# Patient Record
Sex: MALE | Race: WHITE | NOT HISPANIC OR LATINO | Employment: FULL TIME | URBAN - NONMETROPOLITAN AREA
[De-identification: names, ages, dates, MRNs, and addresses within clinical notes are randomized per-mention and may not be internally consistent; named-entity substitution may affect disease eponyms.]

---

## 2022-01-09 PROCEDURE — U0004 COV-19 TEST NON-CDC HGH THRU: HCPCS | Performed by: NURSE PRACTITIONER

## 2024-04-17 ENCOUNTER — APPOINTMENT (OUTPATIENT)
Dept: CT IMAGING | Facility: HOSPITAL | Age: 34
End: 2024-04-17
Payer: COMMERCIAL

## 2024-04-17 ENCOUNTER — APPOINTMENT (OUTPATIENT)
Dept: GENERAL RADIOLOGY | Facility: HOSPITAL | Age: 34
End: 2024-04-17
Payer: COMMERCIAL

## 2024-04-17 ENCOUNTER — HOSPITAL ENCOUNTER (EMERGENCY)
Facility: HOSPITAL | Age: 34
Discharge: HOME | End: 2024-04-17
Payer: COMMERCIAL

## 2024-04-17 VITALS
DIASTOLIC BLOOD PRESSURE: 85 MMHG | HEIGHT: 71 IN | HEART RATE: 76 BPM | BODY MASS INDEX: 32.9 KG/M2 | SYSTOLIC BLOOD PRESSURE: 126 MMHG | OXYGEN SATURATION: 97 % | TEMPERATURE: 97.9 F | RESPIRATION RATE: 20 BRPM | WEIGHT: 235 LBS

## 2024-04-17 DIAGNOSIS — F41.0 PANIC ATTACK: ICD-10-CM

## 2024-04-17 DIAGNOSIS — R06.00 DYSPNEA, UNSPECIFIED TYPE: Primary | ICD-10-CM

## 2024-04-17 LAB
ALBUMIN SERPL-MCNC: 4.7 G/DL (ref 3.5–5.2)
ALBUMIN/GLOB SERPL: 1.8 G/DL
ALP SERPL-CCNC: 49 U/L (ref 39–117)
ALT SERPL W P-5'-P-CCNC: 26 U/L (ref 1–41)
AMPHET+METHAMPHET UR QL: NEGATIVE
AMPHETAMINES UR QL: NEGATIVE
ANION GAP SERPL CALCULATED.3IONS-SCNC: 8 MMOL/L (ref 5–15)
AST SERPL-CCNC: 28 U/L (ref 1–40)
BARBITURATES UR QL SCN: NEGATIVE
BASOPHILS # BLD AUTO: 0.05 10*3/MM3 (ref 0–0.2)
BASOPHILS NFR BLD AUTO: 0.5 % (ref 0–1.5)
BENZODIAZ UR QL SCN: NEGATIVE
BILIRUB SERPL-MCNC: 0.4 MG/DL (ref 0–1.2)
BUN SERPL-MCNC: 18 MG/DL (ref 6–20)
BUN/CREAT SERPL: 15.3 (ref 7–25)
BUPRENORPHINE SERPL-MCNC: NEGATIVE NG/ML
CALCIUM SPEC-SCNC: 9.6 MG/DL (ref 8.6–10.5)
CANNABINOIDS SERPL QL: NEGATIVE
CHLORIDE SERPL-SCNC: 103 MMOL/L (ref 98–107)
CO2 SERPL-SCNC: 27 MMOL/L (ref 22–29)
COCAINE UR QL: NEGATIVE
CREAT SERPL-MCNC: 1.18 MG/DL (ref 0.76–1.27)
DEPRECATED RDW RBC AUTO: 38.8 FL (ref 37–54)
EGFRCR SERPLBLD CKD-EPI 2021: 83 ML/MIN/1.73
EOSINOPHIL # BLD AUTO: 0.08 10*3/MM3 (ref 0–0.4)
EOSINOPHIL NFR BLD AUTO: 0.8 % (ref 0.3–6.2)
ERYTHROCYTE [DISTWIDTH] IN BLOOD BY AUTOMATED COUNT: 12.8 % (ref 12.3–15.4)
FENTANYL UR-MCNC: NEGATIVE NG/ML
FLUAV RNA RESP QL NAA+PROBE: NOT DETECTED
FLUBV RNA RESP QL NAA+PROBE: NOT DETECTED
GLOBULIN UR ELPH-MCNC: 2.6 GM/DL
GLUCOSE SERPL-MCNC: 103 MG/DL (ref 65–99)
HCT VFR BLD AUTO: 42.3 % (ref 37.5–51)
HGB BLD-MCNC: 14.6 G/DL (ref 13–17.7)
IMM GRANULOCYTES # BLD AUTO: 0.02 10*3/MM3 (ref 0–0.05)
IMM GRANULOCYTES NFR BLD AUTO: 0.2 % (ref 0–0.5)
LYMPHOCYTES # BLD AUTO: 1.56 10*3/MM3 (ref 0.7–3.1)
LYMPHOCYTES NFR BLD AUTO: 16.3 % (ref 19.6–45.3)
MCH RBC QN AUTO: 28.7 PG (ref 26.6–33)
MCHC RBC AUTO-ENTMCNC: 34.5 G/DL (ref 31.5–35.7)
MCV RBC AUTO: 83.1 FL (ref 79–97)
METHADONE UR QL SCN: NEGATIVE
MONOCYTES # BLD AUTO: 0.57 10*3/MM3 (ref 0.1–0.9)
MONOCYTES NFR BLD AUTO: 5.9 % (ref 5–12)
NEUTROPHILS NFR BLD AUTO: 7.3 10*3/MM3 (ref 1.7–7)
NEUTROPHILS NFR BLD AUTO: 76.3 % (ref 42.7–76)
NRBC BLD AUTO-RTO: 0 /100 WBC (ref 0–0.2)
OPIATES UR QL: NEGATIVE
OXYCODONE UR QL SCN: NEGATIVE
PCP UR QL SCN: NEGATIVE
PLATELET # BLD AUTO: 300 10*3/MM3 (ref 140–450)
PMV BLD AUTO: 9.5 FL (ref 6–12)
POTASSIUM SERPL-SCNC: 3.6 MMOL/L (ref 3.5–5.2)
PROT SERPL-MCNC: 7.3 G/DL (ref 6–8.5)
RBC # BLD AUTO: 5.09 10*6/MM3 (ref 4.14–5.8)
RSV RNA RESP QL NAA+PROBE: NOT DETECTED
SARS-COV-2 RNA RESP QL NAA+PROBE: NOT DETECTED
SODIUM SERPL-SCNC: 138 MMOL/L (ref 136–145)
TRICYCLICS UR QL SCN: NEGATIVE
TROPONIN T SERPL HS-MCNC: <6 NG/L
TSH SERPL DL<=0.05 MIU/L-ACNC: 1.88 UIU/ML (ref 0.27–4.2)
WBC NRBC COR # BLD AUTO: 9.58 10*3/MM3 (ref 3.4–10.8)

## 2024-04-17 PROCEDURE — 25510000001 IOPAMIDOL PER 1 ML: Performed by: NURSE PRACTITIONER

## 2024-04-17 PROCEDURE — 96374 THER/PROPH/DIAG INJ IV PUSH: CPT

## 2024-04-17 PROCEDURE — 25010000002 LORAZEPAM PER 2 MG: Performed by: NURSE PRACTITIONER

## 2024-04-17 PROCEDURE — 71045 X-RAY EXAM CHEST 1 VIEW: CPT

## 2024-04-17 PROCEDURE — 99285 EMERGENCY DEPT VISIT HI MDM: CPT

## 2024-04-17 PROCEDURE — 36415 COLL VENOUS BLD VENIPUNCTURE: CPT

## 2024-04-17 PROCEDURE — 80307 DRUG TEST PRSMV CHEM ANLYZR: CPT | Performed by: NURSE PRACTITIONER

## 2024-04-17 PROCEDURE — 80053 COMPREHEN METABOLIC PANEL: CPT | Performed by: NURSE PRACTITIONER

## 2024-04-17 PROCEDURE — 85025 COMPLETE CBC W/AUTO DIFF WBC: CPT | Performed by: NURSE PRACTITIONER

## 2024-04-17 PROCEDURE — 84484 ASSAY OF TROPONIN QUANT: CPT | Performed by: NURSE PRACTITIONER

## 2024-04-17 PROCEDURE — 87637 SARSCOV2&INF A&B&RSV AMP PRB: CPT | Performed by: NURSE PRACTITIONER

## 2024-04-17 PROCEDURE — 93010 ELECTROCARDIOGRAM REPORT: CPT | Performed by: INTERNAL MEDICINE

## 2024-04-17 PROCEDURE — 93005 ELECTROCARDIOGRAM TRACING: CPT | Performed by: EMERGENCY MEDICINE

## 2024-04-17 PROCEDURE — 71275 CT ANGIOGRAPHY CHEST: CPT

## 2024-04-17 PROCEDURE — 84443 ASSAY THYROID STIM HORMONE: CPT | Performed by: NURSE PRACTITIONER

## 2024-04-17 RX ORDER — SODIUM CHLORIDE 0.9 % (FLUSH) 0.9 %
10 SYRINGE (ML) INJECTION AS NEEDED
Status: DISCONTINUED | OUTPATIENT
Start: 2024-04-17 | End: 2024-04-17 | Stop reason: HOSPADM

## 2024-04-17 RX ORDER — LORAZEPAM 2 MG/ML
1 INJECTION INTRAMUSCULAR ONCE
Status: COMPLETED | OUTPATIENT
Start: 2024-04-17 | End: 2024-04-17

## 2024-04-17 RX ORDER — FLUOXETINE HYDROCHLORIDE 20 MG/1
20 CAPSULE ORAL DAILY
COMMUNITY

## 2024-04-17 RX ADMIN — LORAZEPAM 1 MG: 2 INJECTION, SOLUTION INTRAMUSCULAR; INTRAVENOUS at 15:23

## 2024-04-17 RX ADMIN — IOPAMIDOL 100 ML: 755 INJECTION, SOLUTION INTRAVENOUS at 15:30

## 2024-04-17 NOTE — DISCHARGE INSTRUCTIONS
Return to ER if symptoms worsen   Follow up with one of the [facility] physician groups below to setup primary care. If you have trouble making an appointment, please call the [facility] Nurse Line at [phone]    [facility] - [place]  [address] [place] [address]  [id number] [phone]    [facility] - [address], [address], [place], [place] [address] [phone]    [facility] - [address], [address], [place], [place] [id number] [phone]     [facility] ([facility]) - [place]  [address], [address], [place] [address] [phone]    [facility] - [address], [address] [phone]    [facility] - [address], [place] [address] [phone]    [facility] - [place]  [address] [place], [place], [address] [phone]    [facility] - [place]  [address], [address] [phone]    [facility] - [employer]  [address] [address] [phone]    [facility] - [place]  [address] [place] [address], [place], [address] [phone]        PEDIATRIC:    [facility] - [place] [address], [address], [place] [address] [phone]

## 2024-04-17 NOTE — ED PROVIDER NOTES
"Subjective   History of Present Illness  Patient is a 34-year-old male presents emergency department per EMS with complaints of dyspnea, dizziness, sudden onset of shortness of breath while driving down the road.  He does have a history of anxiety however he has not had a panic attack in probably about 2 years.  He states he has not been under any unusual stress.  He states he is very stressed at work.  He does take Prozac for anxiety and has been on the same dose for about the past year.  He states while driving down the road he felt sudden onset of shortness of breath dizziness, panic, tingling in his arms and legs and felt like he could not \"get a good breath in.\"  He states he pulled over on the side of the road and called his wife his wife arrived was going to bring him to the hospital and he became very short of breath and anxious and tearful and panicky and EMS was called and patient was transported here per EMS.  He denies any recent travel.  Denies any chest pain states he just had some chest tightness when he could not get in a good breath.  He denies any cough or congestion.  He states he did feel like his heart was racing when this happened as well.  No fever or chills.  He had some nausea but no vomiting.  He states he still feels very anxious at this time and feels short of breath as well.  He is unsure of his family medical history.    History provided by:  Patient   used: No        Review of Systems   Constitutional: Negative.    HENT: Negative.     Eyes: Negative.    Respiratory:          Patient is a 34-year-old male presents emergency department per EMS with complaints of dyspnea, dizziness, sudden onset of shortness of breath while driving down the road.  He does have a history of anxiety however he has not had a panic attack in probably about 2 years.  He states he has not been under any unusual stress.  He states he is very stressed at work.  He does take Prozac for anxiety " "and has been on the same dose for about the past year.  He states while driving down the road he felt sudden onset of shortness of breath dizziness, panic, tingling in his arms and legs and felt like he could not \"get a good breath in.\"  He states he pulled over on the side of the road and called his wife his wife arrived was going to bring him to the hospital and he became very short of breath and anxious and tearful and panicky and EMS was called and patient was transported here per EMS.  He denies any recent travel.  Denies any chest pain states he just had some chest tightness when he could not get in a good breath.  He denies any cough or congestion.  He states he did feel like his heart was racing when this happened as well.  No fever or chills.  He had some nausea but no vomiting.  He states he still feels very anxious at this time and feels short of breath as well.  He is unsure of his family medical history.     Cardiovascular: Negative.    Gastrointestinal: Negative.    Endocrine: Negative.    Genitourinary: Negative.    Musculoskeletal: Negative.    Skin: Negative.    Allergic/Immunologic: Negative.    Neurological: Negative.    Hematological: Negative.    Psychiatric/Behavioral: Negative.     All other systems reviewed and are negative.      Past Medical History:   Diagnosis Date    Anxiety        Allergies   Allergen Reactions    Rocephin [Ceftriaxone] Anxiety     Pt states he got it as a shot once, caused panic attack       History reviewed. No pertinent surgical history.    History reviewed. No pertinent family history.    Social History     Socioeconomic History    Marital status:    Tobacco Use    Smoking status: Never    Smokeless tobacco: Never   Substance and Sexual Activity    Alcohol use: Never       Prior to Admission medications    Medication Sig Start Date End Date Taking? Authorizing Provider   FLUoxetine (PROzac) 20 MG capsule Take 1 capsule by mouth Daily.    Provider, Historical, " "MD       /87 (BP Location: Left arm, Patient Position: Sitting)   Pulse 105   Temp 97.9 °F (36.6 °C) (Oral)   Resp 22   Ht 180.3 cm (71\")   Wt 107 kg (235 lb)   SpO2 98%   BMI 32.78 kg/m²     Objective   Physical Exam  Vitals and nursing note reviewed.   Constitutional:       Appearance: He is well-developed.      Comments: Patient feels very anxious.  Vitals are stable with a blood pressure 152/87, temp 97 9, heart rate 105, respirations 22, O2 sats 100% on room air.   HENT:      Head: Normocephalic and atraumatic.   Eyes:      Conjunctiva/sclera: Conjunctivae normal.      Pupils: Pupils are equal, round, and reactive to light.   Cardiovascular:      Rate and Rhythm: Normal rate and regular rhythm.      Heart sounds: Normal heart sounds.   Pulmonary:      Effort: Pulmonary effort is normal.      Breath sounds: Normal breath sounds.      Comments: Respirations even and unlabored.  Lungs are clear to auscultation.  Abdominal:      General: Bowel sounds are normal.      Palpations: Abdomen is soft.   Musculoskeletal:         General: Normal range of motion.      Cervical back: Normal range of motion and neck supple.   Skin:     General: Skin is warm and dry.   Neurological:      Mental Status: He is alert and oriented to person, place, and time.      Deep Tendon Reflexes: Reflexes are normal and symmetric.   Psychiatric:         Behavior: Behavior normal.         Thought Content: Thought content normal.         Judgment: Judgment normal.         Procedures     Wells' Criteria for Pulmonary Embolism - MDCalc  Calculated on Apr 17 2024 4:03 PM  4.5 points -> Moderate risk group: 16.2% chance of PE in an ED population. Another study assigned scores ? 4 as “PE Unlikely” and had a 3% incidence of PE. Another study assigned scores > 4 as “PE Likely” and had a 28% incidence of PE.    Lab Results (last 24 hours)       Procedure Component Value Units Date/Time    CBC & Differential [[id number]]  (Abnormal) " Collected: 04/17/24 1516    Specimen: Blood Updated: 04/17/24 1531    Narrative:      The following orders were created for panel order CBC & Differential.  Procedure                               Abnormality         Status                     ---------                               -----------         ------                     CBC Auto Differential[[id number]]        Abnormal            Final result                 Please view results for these tests on the individual orders.    Comprehensive Metabolic Panel [[id number]]  (Abnormal) Collected: 04/17/24 1516    Specimen: Blood Updated: 04/17/24 1547     Glucose 103 mg/dL      BUN 18 mg/dL      Creatinine 1.18 mg/dL      Sodium 138 mmol/L      Potassium 3.6 mmol/L      Chloride 103 mmol/L      CO2 27.0 mmol/L      Calcium 9.6 mg/dL      Total Protein 7.3 g/dL      Albumin 4.7 g/dL      ALT (SGPT) 26 U/L      AST (SGOT) 28 U/L      Alkaline Phosphatase 49 U/L      Total Bilirubin 0.4 mg/dL      Globulin 2.6 gm/dL      A/G Ratio 1.8 g/dL      BUN/Creatinine Ratio 15.3     Anion Gap 8.0 mmol/L      eGFR 83.0 mL/min/1.73     Narrative:      GFR Normal >60  Chronic Kidney Disease <60  Kidney Failure <15      Single High Sensitivity Troponin T [[id number]]  (Normal) Collected: 04/17/24 1516    Specimen: Blood Updated: 04/17/24 1545     HS Troponin T <6 ng/L     Narrative:      High Sensitive Troponin T Reference Range:  <14.0 ng/L- Negative Female for AMI  <22.0 ng/L- Negative Male for AMI  >=14 - Abnormal Female indicating possible myocardial injury.  >=22 - Abnormal Male indicating possible myocardial injury.   Clinicians would have to utilize clinical acumen, EKG, Troponin, and serial changes to determine if it is an Acute Myocardial Infarction or myocardial injury due to an underlying chronic condition.         TSH [[id number]]  (Normal) Collected: 04/17/24 1516    Specimen: Blood Updated: 04/17/24 1552     TSH 1.880 uIU/mL     CBC Auto Differential [[id number]]   (Abnormal) Collected: 04/17/24 1516    Specimen: Blood Updated: 04/17/24 1531     WBC 9.58 10*3/mm3      RBC 5.09 10*6/mm3      Hemoglobin 14.6 g/dL      Hematocrit 42.3 %      MCV 83.1 fL      MCH 28.7 pg      MCHC 34.5 g/dL      RDW 12.8 %      RDW-SD 38.8 fl      MPV 9.5 fL      Platelets 300 10*3/mm3      Neutrophil % 76.3 %      Lymphocyte % 16.3 %      Monocyte % 5.9 %      Eosinophil % 0.8 %      Basophil % 0.5 %      Immature Grans % 0.2 %      Neutrophils, Absolute 7.30 10*3/mm3      Lymphocytes, Absolute 1.56 10*3/mm3      Monocytes, Absolute 0.57 10*3/mm3      Eosinophils, Absolute 0.08 10*3/mm3      Basophils, Absolute 0.05 10*3/mm3      Immature Grans, Absolute 0.02 10*3/mm3      nRBC 0.0 /100 WBC     COVID PRE-OP / PRE-PROCEDURE SCREENING ORDER (NO ISOLATION) - Swab, Nasopharynx [[id number]]  (Normal) Collected: 04/17/24 1517    Specimen: Swab from Nasopharynx Updated: 04/17/24 1604    Narrative:      The following orders were created for panel order COVID PRE-OP / PRE-PROCEDURE SCREENING ORDER (NO ISOLATION) - Swab, Nasopharynx.  Procedure                               Abnormality         Status                     ---------                               -----------         ------                     COVID-19, FLU A/B, RSV P...[[id number]]  Normal              Final result                 Please view results for these tests on the individual orders.    COVID-19, FLU A/B, RSV PCR 1 HR TAT - Swab, Nasopharynx [[id number]]  (Normal) Collected: 04/17/24 1517    Specimen: Swab from Nasopharynx Updated: 04/17/24 1604     COVID19 Not Detected     Influenza A PCR Not Detected     Influenza B PCR Not Detected     RSV, PCR Not Detected    Narrative:      Fact sheet for providers: https://www.fda.gov/media/393987/download    Fact sheet for patients: https://www.fda.gov/media/027688/download    Test performed by PCR.    Urine Drug Screen - Urine, Clean Catch [[id number]]  (Normal) Collected: 04/17/24 1524     Specimen: Urine, Clean Catch Updated: 04/17/24 1600     THC, Screen, Urine Negative     Phencyclidine (PCP), Urine Negative     Cocaine Screen, Urine Negative     Methamphetamine, Ur Negative     Opiate Screen Negative     Amphetamine Screen, Urine Negative     Benzodiazepine Screen, Urine Negative     Tricyclic Antidepressants Screen Negative     Methadone Screen, Urine Negative     Barbiturates Screen, Urine Negative     Oxycodone Screen, Urine Negative     Buprenorphine, Screen, Urine Negative    Narrative:      Cutoff For Drugs Screened:    Amphetamines               500 ng/ml  Barbiturates               200 ng/ml  Benzodiazepines            150 ng/ml  Cocaine                    150 ng/ml  Methadone                  200 ng/ml  Opiates                    100 ng/ml  Phencyclidine               25 ng/ml  THC                         50 ng/ml  Methamphetamine            500 ng/ml  Tricyclic Antidepressants  300 ng/ml  Oxycodone                  100 ng/ml  Buprenorphine               10 ng/ml    The normal value for all drugs tested is negative. This report includes unconfirmed screening results, with the cutoff values listed, to be used for medical treatment purposes only.  Unconfirmed results must not be used for non-medical purposes such as employment or legal testing.  Clinical consideration should be applied to any drug of abuse test, particularly when unconfirmed results are used.      Fentanyl, Urine - Urine, Clean Catch [[id number]]  (Normal) Collected: 04/17/24 1524    Specimen: Urine, Clean Catch Updated: 04/17/24 1600     Fentanyl, Urine Negative    Narrative:      Negative Threshold:      Fentanyl 5 ng/mL     The normal value for the drug tested is negative. This report includes final unconfirmed screening results to be used for medical treatment purposes only. Unconfirmed results must not be used for non-medical purposes such as employment or legal testing. Clinical consideration should be applied to any  drug of abuse test, particularly when unconfirmed results are used.                   CT Angiogram Chest   Final Result       1.  No evidence of pulmonary embolus or other acute findings in the   chest.          2.  No evidence of an airspace consolidation in the LEFT lung base. The   finding on the radiograph is likely artifactual.               This report was signed and finalized on 4/17/2024 3:42 PM by Dr. John Ames MD.          XR Chest 1 View   Final Result   1. Patchy opacity at the LEFT lung base.       This report was signed and finalized on 4/17/2024 3:17 PM by Dr Diann Acevedo MD.              ED Course  ED Course as of 04/17/24 1621   Wed Apr 17, 2024   1617 COVID, influenza, RSV are all negative.  TSH is 1.880.  CMP is unremarkable.  CBC shows no leukocytosis.  Urine drug screen is negative.  Troponins negative.  Chest x-ray initially stated he may have a left lower lobe infiltrate however CTA of the chest advises that the chest x-ray finding was are factual and there is no consolidation noted.  No pulmonary embolus noted as well.  Patient did receive Ativan 1 mg IV and states he is feeling much better at present.  Advised the patient he needs to follow-up with his primary care doctor to see if he needs to change his anxiety medications.  Feel most likely that this was a panic attack.  Patient states he is having no further shortness of breath or tingling.  He states he really does not have a primary care provider.  Will print him out a list of primary care doctors to follow-up with.  He is in agreement with the care plan and voices understanding of instructions.  Advised reasons to return the emergency department.  Patient will be discharged shortly in stable condition [CW]      ED Course User Index  [CW] Raquel Navarro APRN        Medical Decision Making  Patient is a 34-year-old male presents emergency department per EMS with complaints of dyspnea, dizziness, sudden onset of  "shortness of breath while driving down the road.  He does have a history of anxiety however he has not had a panic attack in probably about 2 years.  He states he has not been under any unusual stress.  He states he is very stressed at work.  He does take Prozac for anxiety and has been on the same dose for about the past year.  He states while driving down the road he felt sudden onset of shortness of breath dizziness, panic, tingling in his arms and legs and felt like he could not \"get a good breath in.\"  He states he pulled over on the side of the road and called his wife his wife arrived was going to bring him to the hospital and he became very short of breath and anxious and tearful and panicky and EMS was called and patient was transported here per EMS.  He denies any recent travel.  Denies any chest pain states he just had some chest tightness when he could not get in a good breath.  He denies any cough or congestion.  He states he did feel like his heart was racing when this happened as well.  No fever or chills.  He had some nausea but no vomiting.  He states he still feels very anxious at this time and feels short of breath as well.  He is unsure of his family medical history.  Course of treatment in the er: Nontoxic-appearing 34-year-old male presents emergency department with sudden onset dyspnea while driving down the road today.  He had some chest tightness as well.  He states he started having some tingling in his arms and legs as well and felt like he was going to pass out.  He had been at his normal state of health before this event.  He states he does have a history of panic attacks however he has not had a panic attack within the past 2 years.  He states he does have a lot of stress at work.  He takes Prozac for anxiety however he has been on the same dose for about the past year as well.  He is nontoxic-appearing.  His respirations are even and unlabored.  Heart rate is slightly tachycardic at " 105.  States he still feels very short of breath at present.  Lungs are clear to auscultation.  Abdomen is soft, nondistended nontender.  Laboratory studies, chest x-ray CTA of the chest, TSH, EKG have been ordered.  Have ordered Ativan for anxiety.  Differential diagnosis: Pulmonary embolus, hyperthyroidism, pneumonia, pneumothorax, COVID, and ACS, palpitations  CT Angiogram Chest   Final Result         1.  No evidence of pulmonary embolus or other acute findings in the    chest.            2.  No evidence of an airspace consolidation in the LEFT lung base. The    finding on the radiograph is likely artifactual.                   This report was signed and finalized on 4/17/2024 3:42 PM by Dr. John Ames MD.          XR Chest 1 View   Final Result    1. Patchy opacity at the LEFT lung base.         This report was signed and finalized on 4/17/2024 3:17 PM by Dr Diann Acevedo MD.          Labs Reviewed  COMPREHENSIVE METABOLIC PANEL - Abnormal; Notable for the following components:     Glucose                       103 (*)             All other components within normal limits         Narrative: GFR Normal >60                  Chronic Kidney Disease <60                  Kidney Failure <15                    CBC WITH AUTO DIFFERENTIAL - Abnormal; Notable for the following components:     Neutrophil %                  76.3 (*)               Lymphocyte %                  16.3 (*)               Neutrophils, Absolute         7.30 (*)            All other components within normal limits  COVID-19/FLUA&B/RSV, NP SWAB IN TRANSPORT MEDIA 1 HR TAT - Normal         Narrative: Fact sheet for providers: https://www.fda.gov/media/824959/download                    Fact sheet for patients: https://www.fda.gov/media/997076/download                                    Test performed by PCR.  URINE DRUG SCREEN - Normal         Narrative: Cutoff For Drugs Screened:                                    Amphetamines                500 ng/ml                  Barbiturates               200 ng/ml                  Benzodiazepines            150 ng/ml                  Cocaine                    150 ng/ml                  Methadone                  200 ng/ml                  Opiates                    100 ng/ml                  Phencyclidine               25 ng/ml                  THC                         50 ng/ml                  Methamphetamine            500 ng/ml                  Tricyclic Antidepressants  300 ng/ml                  Oxycodone                  100 ng/ml                  Buprenorphine               10 ng/ml                                    The normal value for all drugs tested is negative. This report includes unconfirmed screening results, with the cutoff values listed, to be used for medical treatment purposes only.  Unconfirmed results must not be used for non-medical purposes such as employment or legal testing.  Clinical consideration should be applied to any drug of abuse test, particularly when unconfirmed results are used.    SINGLE HS TROPONIN T - Normal         Narrative: High Sensitive Troponin T Reference Range:                  <14.0 ng/L- Negative Female for AMI                  <22.0 ng/L- Negative Male for AMI                  >=14 - Abnormal Female indicating possible myocardial injury.                  >=22 - Abnormal Male indicating possible myocardial injury.                   Clinicians would have to utilize clinical acumen, EKG, Troponin, and serial changes to determine if it is an Acute Myocardial Infarction or myocardial injury due to an underlying chronic condition.                                       TSH - Normal  FENTANYL, URINE - Normal         Narrative: Negative Threshold:                                      Fentanyl 5 ng/mL                                     The normal value for the drug tested is negative. This report includes final unconfirmed screening results to be used for medical  treatment purposes only. Unconfirmed results must not be used for non-medical purposes such as employment or legal testing. Clinical consideration should be applied to any drug of abuse test, particularly when unconfirmed results are used.         COVID PRE-OP / PRE-PROCEDURE SCREENING ORDER (NO ISOLATION)         Narrative: The following orders were created for panel order COVID PRE-OP / PRE-PROCEDURE SCREENING ORDER (NO ISOLATION) - Swab, Nasopharynx.                  Procedure                               Abnormality         Status                                     ---------                               -----------         ------                                     COVID-19, FLU A/B, RSV P...[[id number]]  Normal              Final result                                                 Please view results for these tests on the individual orders.  CBC AND DIFFERENTIAL  COVID, influenza, RSV are all negative.  TSH is 1.880.  CMP is unremarkable.  CBC shows no leukocytosis.  Urine drug screen is negative.  Troponins negative.  Chest x-ray initially stated he may have a left lower lobe infiltrate however CTA of the chest advises that the chest x-ray finding was are factual and there is no consolidation noted.  No pulmonary embolus noted as well.  Patient did receive Ativan 1 mg IV and states he is feeling much better at present.  Advised the patient he needs to follow-up with his primary care doctor to see if he needs to change his anxiety medications.  Feel most likely that this was a panic attack.  Patient states he is having no further shortness of breath or tingling.  He states he really does not have a primary care provider.  Will print him out a list of primary care doctors to follow-up with.  He is in agreement with the care plan and voices understanding of instructions.  Advised reasons to return the emergency department.  Patient will be discharged shortly in stable condition      Amount and/or Complexity  of Data Reviewed  Labs: ordered. Decision-making details documented in ED Course.  Radiology: ordered. Decision-making details documented in ED Course.  ECG/medicine tests: ordered. Decision-making details documented in ED Course.    Risk  Prescription drug management.         Final diagnoses:   Dyspnea, unspecified type   Panic attack          Raquel Navarro, APRN  04/17/24 7968

## 2024-04-17 NOTE — Clinical Note
[facility] EMERGENCY DEPARTMENT  [address]  [facility] [address]  Phone: [phone]    John Holman was seen and treated in our emergency department on 4/17/2024.  He may return to work on 04/19/2024.         Thank you for choosing [facility].    Raquel Navarro APRN

## 2024-04-22 LAB
QT INTERVAL: 368 MS
QTC INTERVAL: 399 MS